# Patient Record
Sex: MALE | URBAN - METROPOLITAN AREA
[De-identification: names, ages, dates, MRNs, and addresses within clinical notes are randomized per-mention and may not be internally consistent; named-entity substitution may affect disease eponyms.]

---

## 2021-02-23 ENCOUNTER — APPOINTMENT (RX ONLY)
Dept: URBAN - METROPOLITAN AREA CLINIC 42 | Facility: CLINIC | Age: 61
Setting detail: DERMATOLOGY
End: 2021-02-23

## 2021-02-23 DIAGNOSIS — L29.89 OTHER PRURITUS: ICD-10-CM | Status: INADEQUATELY CONTROLLED

## 2021-02-23 DIAGNOSIS — L29.8 OTHER PRURITUS: ICD-10-CM | Status: INADEQUATELY CONTROLLED

## 2021-02-23 PROCEDURE — 99204 OFFICE O/P NEW MOD 45 MIN: CPT

## 2021-02-23 PROCEDURE — ? COUNSELING

## 2021-02-23 PROCEDURE — ? DIAGNOSIS COMMENT

## 2021-02-23 ASSESSMENT — LOCATION DETAILED DESCRIPTION DERM
LOCATION DETAILED: INFERIOR THORACIC SPINE
LOCATION DETAILED: LEFT PROXIMAL PRETIBIAL REGION
LOCATION DETAILED: RIGHT PROXIMAL PRETIBIAL REGION

## 2021-02-23 ASSESSMENT — LOCATION SIMPLE DESCRIPTION DERM
LOCATION SIMPLE: RIGHT PRETIBIAL REGION
LOCATION SIMPLE: UPPER BACK
LOCATION SIMPLE: LEFT PRETIBIAL REGION

## 2021-02-23 ASSESSMENT — LOCATION ZONE DERM
LOCATION ZONE: LEG
LOCATION ZONE: TRUNK

## 2021-02-23 NOTE — PROCEDURE: MIPS QUALITY
Additional Notes: COVID 19 Screening Questions\\n\\nHave you traveled internationally or on a cruise ship in the last 14 days? No \\nHave you traveled out of state within the [place] in the last 14 days? No\\nHave you been in contact with anyone who is suspected of having, being tested, or has tested positive for COVID? No \\nHave you been experiencing any fever or respiratory symptoms? No\\nHave you been experiencing any GI symptoms (diarrhea, vomiting, nausea, stomach pain) in the last week? No

## 2021-02-23 NOTE — PROCEDURE: DIAGNOSIS COMMENT
Detail Level: Zone
Render Risk Assessment In Note?: yes
Comment: Reports constant itching and burning x 2 years\\nTemporarily alleviated by cold showers, Enstillar, and Benadryl.\\n\\nPurpura secondary to ithcing  on L dorsal forearm\\nNo primary skin changes. No burrows noted u/e today.\\n\\nDiscussed that pruritus is likely not related to medications, infection, autoimmune disease, or Lyme. \\n\\nDiscussed full body CT, pt would like to wait at this time. Can consider light box therapy in the future if itching does not resolve. Discussed using prednisone, but will try Zyrtec 2 pills PO BID x 4 weeks. \\n\\nSTART \\nZyrtec 10mg 1-2 pills twice daily x 4 weeks

## 2021-02-23 NOTE — HPI: OTHER
Condition:: Itch
Please Describe Your Condition:: Severe full body itch x 2 years \\nExcoriations on BL ankles and left pretibial region\\nHas previously tried clobetasol, enstillar spray, Xyzal, and Benadryl per previous derm\\nNo previous bx\\nLyme titer negative, GIFTY negative \\nHere for further evaluation and management

## 2021-03-22 ENCOUNTER — RX ONLY (OUTPATIENT)
Age: 61
Setting detail: RX ONLY
End: 2021-03-22

## 2021-03-22 RX ORDER — CALCIPOTRIENE AND BETAMETHASONE DIPROPIONATE 50; .5 UG/G; MG/G
APPLY AEROSOL, FOAM TOPICAL BID
Qty: 1 | Refills: 2 | Status: ERX | COMMUNITY
Start: 2021-03-22

## 2021-03-24 ENCOUNTER — RX ONLY (OUTPATIENT)
Age: 61
Setting detail: RX ONLY
End: 2021-03-24

## 2021-03-24 RX ORDER — CALCIPOTRIENE AND BETAMETHASONE DIPROPIONATE 50; .5 UG/G; MG/G
AEROSOL, FOAM TOPICAL BID
Qty: 1 | Refills: 2 | Status: ACTIVE

## 2024-07-25 ENCOUNTER — OFFICE (OUTPATIENT)
Dept: URBAN - METROPOLITAN AREA CLINIC 34 | Facility: CLINIC | Age: 64
End: 2024-07-25
Payer: COMMERCIAL

## 2024-07-25 VITALS
HEIGHT: 68 IN | WEIGHT: 166 LBS | DIASTOLIC BLOOD PRESSURE: 81 MMHG | TEMPERATURE: 97.5 F | SYSTOLIC BLOOD PRESSURE: 134 MMHG | HEART RATE: 63 BPM

## 2024-07-25 DIAGNOSIS — K76.0 FATTY (CHANGE OF) LIVER, NOT ELSEWHERE CLASSIFIED: ICD-10-CM

## 2024-07-25 DIAGNOSIS — R74.01 ELEVATION OF LEVELS OF LIVER TRANSAMINASE LEVELS: ICD-10-CM

## 2024-07-25 DIAGNOSIS — E80.6 OTHER DISORDERS OF BILIRUBIN METABOLISM: ICD-10-CM

## 2024-07-25 DIAGNOSIS — Z80.0 FAMILY HISTORY OF MALIGNANT NEOPLASM OF DIGESTIVE ORGANS: ICD-10-CM

## 2024-07-25 PROCEDURE — 99204 OFFICE O/P NEW MOD 45 MIN: CPT | Performed by: INTERNAL MEDICINE

## 2024-07-25 NOTE — SERVICEHPINOTES
JASON ARAUJO   is a   63   year old male who is being seen in consultation at the request of   JENNY LEMUS   for evaluation of fatty liver disease. He notes a long history of fatty liver disease, maybe going back 30 years. His sister and his son both have been diagnosed with fatty liver disease. He thinks his father may have had fatty liver disease. His ALT was mildly elevated and his bilirubin was mildly elevated on recent bloodwork. He has had ultrasounds at [facility] in the past but nothing recently. He does drink alcohol, usually 2-3 glasses of wine per night. He does have a history of diabetes and is on metformin. His A1C was 6.2%. He does walk 3 miles per day. He avoids processed foods and restricts to 1500 calories per day. br
br He denies any abdominal pain, nausea, vomiting, diarrhea, constipation, melena, rectal bleeding, loss of appetite, weight loss, heartburn/reflux, dysphagia. 
br
br He is overdue for colonoscopy. Last colonoscopy was in 2014 and was normal but his mother had colon cancer and he was due in 2019.

## 2024-07-25 NOTE — SERVICENOTES
Patient understands and agrees with plan. Questions/concerns addressed.

 I personally discussed the procedure with the patient, including the risks, benefits, and alternatives of colonoscopy. The procedure will be done under pharmacological sedation. Risks and potential complications of the procedure include, but not limited to: bleeding, infection, bowel perforation, adverse reaction to anesthetics and missed lesions. Biopsy, dilation, polypectomy and/or maneuvers to control bleeding may be performed. Discussed the need for low fiber diet for a week before procedure and clear liquid diet the day before procedure. Discussed prep with Sutab or Miralax with Gatorade. Patient expressed understanding of all these risks and was given the opportunity to ask questions, which I answered to the patient’s satisfaction. The patient agreed to the procedures.

## 2024-08-14 LAB
ACTIN (SMOOTH MUSCLE) ANTIBODY: 8 UNITS (ref 0–19)
ALPHA-1-ANTITRYPSIN, SERUM: 137 MG/DL (ref 101–187)
ANA BY IFA RFX TITER/PATTERN: NEGATIVE
ANTI-MITOCHONDRIAL AB BY IFA: (no result)
CELIAC DISEASE COMPREHENSIVE: DEAMIDATED GLIADIN ABS, IGA: 3 UNITS (ref 0–19)
CELIAC DISEASE COMPREHENSIVE: DEAMIDATED GLIADIN ABS, IGG: 2 UNITS (ref 0–19)
CELIAC DISEASE COMPREHENSIVE: ENDOMYSIAL ANTIBODY IGA: NEGATIVE
CELIAC DISEASE COMPREHENSIVE: IMMUNOGLOBULIN A, QN, SERUM: 133 MG/DL (ref 61–437)
CELIAC DISEASE COMPREHENSIVE: T-TRANSGLUTAMINASE (TTG) IGA: <2 U/ML
CELIAC DISEASE COMPREHENSIVE: T-TRANSGLUTAMINASE (TTG) IGG: 3 U/ML (ref 0–5)
CERULOPLASMIN: 18.9 MG/DL (ref 16–31)
FERRITIN: 112 NG/ML (ref 30–400)
GGT: 100 IU/L — HIGH (ref 0–65)
HBSAG SCREEN: NEGATIVE
HCV ANTIBODY RFX TO QUANT PCR: HCV AB: NON REACTIVE
HEP A AB, TOTAL: NEGATIVE
HEP B CORE AB, TOT: NEGATIVE
HEPATITIS B SURF AB QUANT: <3.5 MIU/ML — LOW
IMMUNOGLOBULIN G, QN, SERUM: 1003 MG/DL (ref 603–1613)
INTERPRETATION: (no result)
IRON AND TIBC: IRON BIND.CAP.(TIBC): 363 UG/DL (ref 250–450)
IRON AND TIBC: IRON SATURATION: 24 % (ref 15–55)
IRON AND TIBC: IRON: 88 UG/DL (ref 38–169)
IRON AND TIBC: UIBC: 275 UG/DL (ref 111–343)
LIVER-KIDNEY MICROSOMAL AB: 0.5 UNITS (ref 0–20)
MITOCHONDRIAL (M2) ANTIBODY: 23.8 UNITS — HIGH (ref 0–20)
PT AND PTT: APTT: 25 SEC (ref 24–33)
PT AND PTT: INR: 1.1 (ref 0.9–1.2)
PT AND PTT: PROTHROMBIN TIME: 11.9 SEC (ref 9.1–12)
SOLUBLE LIVER AG (IGG AB): ANTI-SLA, IGG: 1.1 UNITS (ref 0–20)
THYROXINE (T4) FREE, DIRECT: T4,FREE(DIRECT): 1.03 NG/DL (ref 0.82–1.77)

## 2025-06-24 ENCOUNTER — OFFICE (OUTPATIENT)
Dept: URBAN - METROPOLITAN AREA CLINIC 34 | Facility: CLINIC | Age: 65
End: 2025-06-24
Payer: COMMERCIAL

## 2025-06-24 VITALS
DIASTOLIC BLOOD PRESSURE: 86 MMHG | HEIGHT: 68 IN | SYSTOLIC BLOOD PRESSURE: 141 MMHG | TEMPERATURE: 98.2 F | WEIGHT: 162 LBS | HEART RATE: 63 BPM

## 2025-06-24 DIAGNOSIS — K74.60 UNSPECIFIED CIRRHOSIS OF LIVER: ICD-10-CM

## 2025-06-24 DIAGNOSIS — R18.8 OTHER ASCITES: ICD-10-CM

## 2025-06-24 DIAGNOSIS — Z80.0 FAMILY HISTORY OF MALIGNANT NEOPLASM OF DIGESTIVE ORGANS: ICD-10-CM

## 2025-06-24 PROCEDURE — 99215 OFFICE O/P EST HI 40 MIN: CPT | Performed by: INTERNAL MEDICINE

## 2025-06-24 NOTE — SERVICENOTES
I spent more than 40 minutes today reviewing the chart, talking with the patient, reviewing previous results with patient, discussing plan, and documenting. Patient understands and agrees with plan. Questions/concerns addressed.

## 2025-06-24 NOTE — SERVICEHPINOTES
JASON ARAUJO   is a   64   male who presents for follow up. He has no acute complaints. Since last visit he has given up alcohol. He has no abdominal pain, nausea, vomiting, diarrhea, constipation, melena, hematemesis, rectal bleeding, loss of appetite. No LE swelling or abdominal distension. No bouts of confusion or forgetfulness. He had EGD which revealed small Grade 1 varices and portal hypertensive gastropathy. He was started on carvedilol but was only taking in once a day instead of twice a day. He has had trace ascites on imaging. His AFP was low. His MRI did reveal cirrhosis related nodules which were stable on follow up MRI. His platelet count recently was 52K (previously 50K). He did have colonoscopy which was normal and is due for colonoscopy in 2029 due to family history.  His WBC has 2.9 on two most recent set of labs. INR is 1.1. MELD is 9. Child Perez score was 6 points.  br
br From prior visit: 
Tea ARAUJO is a 63 male who presents for follow up. He was sent for MRCP following borderline/equivocal antimitochondrial antibody. MRCP revealed liver cirrhosis, splenomegaly and small cirrhosis related nodules. Trace ascites was also seen. No bile duct abnormalities were seen. He feels well. He denies any abdominal pain, nausea, vomiting, diarrhea, constipation, melena, rectal bleeding, hematemesis. He denies any LE swelling. No bouts of confusion or forgetfulness. Cirrhosis is believed to be due to fatty liver. He apparently has had thrombocytopenia for years and last platelet count from early summer was 57K. MELD is 8. Child Perez Score was 6 points (Class A). There is a family history of liver disease.